# Patient Record
(demographics unavailable — no encounter records)

---

## 2024-11-30 NOTE — HISTORY OF PRESENT ILLNESS
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  1. GERD relatively well controlled. triggered by stress, foods (fried, tomato). declines egd. takes pepcid if needed.  2.   utd fit and dexa. declines mammo. declines colo. done w pap. sees dentist, ophtho. skin ok.  3. osteopenia does walking.  4. R wrist fx in  after slipped and fell did pt . had surgery on L wrist 10y ago. lipoma surgery.  5. mom  of cancer not sure what kind age 64. dad had alzheimers. brother has gerd.

## 2024-11-30 NOTE — ADDENDUM
[FreeTextEntry1] : H pylori +  will start triple therapy dw pt test of cure to follow sent to pharmacy

## 2024-11-30 NOTE — ASSESSMENT
[FreeTextEntry1] : 1. GERD after age 50 explained risk of possible gastric cancer; rec egd and she will consider. h pylori test. discussed risk fx avoidance. cont pepcid  2. hm  utd fit and dexa. declines mammo. declines colo. done w pap. sees dentist, ophtho. skin ok. stronlgy rec pt declines vacccines  3. osteopenia does walking. calcium/d 4. R wrist fx improved after rpt  ekg: nsr nl axis/int no st t chg no pathological q waves.

## 2024-11-30 NOTE — HEALTH RISK ASSESSMENT
[0] : 2) Feeling down, depressed, or hopeless: Not at all (0) [PHQ-2 Negative - No further assessment needed] : PHQ-2 Negative - No further assessment needed [Never] : Never [[id number]] : 0

## 2024-11-30 NOTE — HEALTH RISK ASSESSMENT
[0] : 2) Feeling down, depressed, or hopeless: Not at all (0) [PHQ-2 Negative - No further assessment needed] : PHQ-2 Negative - No further assessment needed [Never] : Never [[id number]] : 0

## 2024-11-30 NOTE — HISTORY OF PRESENT ILLNESS
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  1. GERD relatively well controlled. triggered by stress, foods (fried, tomato). declines egd. takes pepcid if needed.  2.   utd fit and dexa. declines mammo. declines colo. done w pap. sees dentist, ophtho. skin ok.  3. osteopenia does walking.  4. R wrist fx in  after slipped and fell did pt . had surgery on L wrist 10y ago. lipoma surgery.  5. mom  of cancer not sure what kind age 64. dad had alzheimers. brother has gerd.